# Patient Record
Sex: FEMALE | Race: WHITE | Employment: OTHER | URBAN - METROPOLITAN AREA
[De-identification: names, ages, dates, MRNs, and addresses within clinical notes are randomized per-mention and may not be internally consistent; named-entity substitution may affect disease eponyms.]

---

## 2017-10-23 ENCOUNTER — HOSPITAL ENCOUNTER (OUTPATIENT)
Dept: MAMMOGRAPHY | Age: 57
Discharge: HOME | End: 2017-10-23
Attending: OBSTETRICS & GYNECOLOGY | Admitting: OBSTETRICS & GYNECOLOGY

## 2017-10-23 DIAGNOSIS — R92.8 ABNORMAL FINDING ON MAMMOGRAPHY: ICD-10-CM

## 2017-10-23 DIAGNOSIS — Z12.31 VISIT FOR SCREENING MAMMOGRAM: ICD-10-CM

## 2017-11-01 ENCOUNTER — OFFICE VISIT (OUTPATIENT)
Dept: FAMILY MEDICINE CLINIC | Age: 57
End: 2017-11-01

## 2017-11-01 VITALS
BODY MASS INDEX: 23.3 KG/M2 | HEIGHT: 66 IN | OXYGEN SATURATION: 97 % | SYSTOLIC BLOOD PRESSURE: 110 MMHG | HEART RATE: 77 BPM | WEIGHT: 145 LBS | DIASTOLIC BLOOD PRESSURE: 70 MMHG

## 2017-11-01 DIAGNOSIS — E78.00 PURE HYPERCHOLESTEROLEMIA: ICD-10-CM

## 2017-11-01 DIAGNOSIS — Z00.00 PREVENTATIVE HEALTH CARE: Primary | ICD-10-CM

## 2017-11-01 DIAGNOSIS — Z12.11 COLON CANCER SCREENING: ICD-10-CM

## 2017-11-01 DIAGNOSIS — C44.320 SCC (SQUAMOUS CELL CARCINOMA), FACE: ICD-10-CM

## 2017-11-01 PROCEDURE — 90630 INFLUENZA, QUADV, 18-64 YRS, ID, PF, MICRO INJ, 0.1ML (FLUZONE QUADV, PF): CPT | Performed by: FAMILY MEDICINE

## 2017-11-01 PROCEDURE — 90715 TDAP VACCINE 7 YRS/> IM: CPT | Performed by: FAMILY MEDICINE

## 2017-11-01 PROCEDURE — 90471 IMMUNIZATION ADMIN: CPT | Performed by: FAMILY MEDICINE

## 2017-11-01 PROCEDURE — 99396 PREV VISIT EST AGE 40-64: CPT | Performed by: FAMILY MEDICINE

## 2017-11-01 PROCEDURE — 90472 IMMUNIZATION ADMIN EACH ADD: CPT | Performed by: FAMILY MEDICINE

## 2017-11-01 ASSESSMENT — ENCOUNTER SYMPTOMS
ABDOMINAL PAIN: 0
BACK PAIN: 0
CONSTIPATION: 0
SHORTNESS OF BREATH: 0
DIARRHEA: 0

## 2017-11-01 NOTE — PATIENT INSTRUCTIONS
for heart attack and stroke. · Blood pressure. Have your blood pressure checked during a routine doctor visit. Your doctor will tell you how often to check your blood pressure based on your age, your blood pressure results, and other factors. · Mammogram. Ask your doctor how often you should have a mammogram, which is an X-ray of your breasts. A mammogram can spot breast cancer before it can be felt and when it is easiest to treat. · Pap test and pelvic exam. Ask your doctor how often you should have a Pap test. You may not need to have a Pap test as often as you used to. · Vision. Have your eyes checked every year or two or as often as your doctor suggests. Some experts recommend that you have yearly exams for glaucoma and other age-related eye problems starting at age 48. · Hearing. Tell your doctor if you notice any change in your hearing. You can have tests to find out how well you hear. · Diabetes. Ask your doctor whether you should have tests for diabetes. · Colon cancer. You should begin tests for colon cancer at age 48. You may have one of several tests. Your doctor will tell you how often to have tests based on your age and risk. Risks include whether you already had a precancerous polyp removed from your colon or whether your parents, sisters and brothers, or children have had colon cancer. · Thyroid disease. Talk to your doctor about whether to have your thyroid checked as part of a regular physical exam. Women have an increased chance of a thyroid problem. · Osteoporosis. You should begin tests for bone density at age 72. If you are younger than 72, ask your doctor whether you have factors that may increase your risk for this disease. You may want to have this test before age 72. · Heart attack and stroke risk. At least every 4 to 6 years, you should have your risk for heart attack and stroke assessed.  Your doctor uses factors such as your age, blood pressure, cholesterol, and whether you smoke foods.  ¨ Eat fruits, vegetables, whole grains (like oatmeal), dried beans and peas, nuts and seeds, soy products (like tofu), and fat-free or low-fat dairy products. ¨ Replace butter, margarine, and hydrogenated or partially hydrogenated oils with olive and canola oils. (Canola oil margarine without trans fat is fine.)  ¨ Replace red meat with fish, poultry, and soy protein (like tofu). ¨ Limit processed and packaged foods like chips, crackers, and cookies. · Be active. Exercise can improve your cholesterol level. Get at least 30 minutes of exercise on most days of the week. Walking is a good choice. You also may want to do other activities, such as running, swimming, cycling, or playing tennis or team sports. · Stay at a healthy weight. Lose weight if you need to. · Don't smoke. If you need help quitting, talk to your doctor about stop-smoking programs and medicines. These can increase your chances of quitting for good. How is high cholesterol treated? The goal of treatment is to reduce your chances of having a heart attack or stroke. The goal is not to lower your cholesterol numbers only. · You may make lifestyle changes, such as eating healthy foods, not smoking, losing weight, and being more active. · You may have to take medicine. Follow-up care is a key part of your treatment and safety. Be sure to make and go to all appointments, and call your doctor if you are having problems. It's also a good idea to know your test results and keep a list of the medicines you take. Where can you learn more? Go to https://Zip[employer]ila.[employer]. org and sign in to your [employer] account. Enter Z053 in the [facility] box to learn more about \"Learning About High Cholesterol. \"     If you do not have an account, please click on the \"Sign Up Now\" link. Current as of: April 3, 2017  Content Version: 11.3  © 1415-0765 [employer]. Care instructions adapted under license by [facility] ([facility]).

## 2018-01-04 ENCOUNTER — OFFICE VISIT (OUTPATIENT)
Dept: FAMILY MEDICINE CLINIC | Age: 58
End: 2018-01-04

## 2018-01-04 VITALS
OXYGEN SATURATION: 98 % | DIASTOLIC BLOOD PRESSURE: 70 MMHG | BODY MASS INDEX: 23.15 KG/M2 | TEMPERATURE: 97.9 F | RESPIRATION RATE: 16 BRPM | SYSTOLIC BLOOD PRESSURE: 100 MMHG | WEIGHT: 144.5 LBS | HEART RATE: 57 BPM

## 2018-01-04 DIAGNOSIS — H10.9 CONJUNCTIVITIS OF BOTH EYES, UNSPECIFIED CONJUNCTIVITIS TYPE: Primary | ICD-10-CM

## 2018-01-04 PROCEDURE — 99213 OFFICE O/P EST LOW 20 MIN: CPT | Performed by: PHYSICIAN ASSISTANT

## 2018-01-04 RX ORDER — POLYMYXIN B SULFATE AND TRIMETHOPRIM 1; 10000 MG/ML; [USP'U]/ML
1 SOLUTION OPHTHALMIC EVERY 4 HOURS
Qty: 2 BOTTLE | Refills: 0 | Status: SHIPPED | OUTPATIENT
Start: 2018-01-04 | End: 2018-01-14

## 2018-01-04 ASSESSMENT — ENCOUNTER SYMPTOMS
WHEEZING: 0
SORE THROAT: 0
EYE REDNESS: 1
EYE DISCHARGE: 1
EYE PAIN: 1
COUGH: 0
EYE ITCHING: 1

## 2018-01-04 NOTE — PROGRESS NOTES
Subjective:      Patient ID: Venkatesh Mcintyre is a 62 y.o. female. HPI  Patient is here today with a 2-3 day history of bilateral eye redness, drainage and burning. She recently got over a URI but has been fine. Her eyes are itchy and burning. She has been wearing her glasses. Review of Systems   Constitutional: Negative for appetite change, chills, fatigue and fever. HENT: Negative for congestion, ear pain and sore throat. Eyes: Positive for pain, discharge, redness and itching. Negative for visual disturbance. Respiratory: Negative for cough and wheezing. Skin: Negative for rash. Neurological: Negative for dizziness and headaches. Objective:   Physical Exam   Constitutional: She is oriented to person, place, and time. Vital signs are normal. She appears well-developed and well-nourished. She is cooperative. Eyes: Pupils are equal, round, and reactive to light. Right conjunctiva is injected. Left conjunctiva is injected (much worse than right). Neck: Neck supple. Pulmonary/Chest: Effort normal and breath sounds normal. No respiratory distress. Lymphadenopathy:     She has no cervical adenopathy. Neurological: She is alert and oriented to person, place, and time. Assessment:          Zenon Buerger was seen today for conjunctivitis. Diagnoses and all orders for this visit:    Conjunctivitis of both eyes, unspecified conjunctivitis type  -     trimethoprim-polymyxin b (POLYTRIM) 25372-2.1 UNIT/ML-% ophthalmic solution; Place 1 drop into both eyes every 4 hours for 10 days         Plan:      Call with any concerns.

## 2018-04-23 ENCOUNTER — HOSPITAL ENCOUNTER (OUTPATIENT)
Dept: OTHER | Age: 58
Discharge: HOME | End: 2018-04-23
Attending: FAMILY MEDICINE | Admitting: FAMILY MEDICINE

## 2018-04-23 DIAGNOSIS — E78.00 PURE HYPERCHOLESTEROLEMIA: ICD-10-CM

## 2018-04-23 LAB
A/G RATIO: 1.6 (ref 1.1–2.2)
ALBUMIN SERPL-MCNC: 4.3 G/DL (ref 3.4–5)
ALP BLD-CCNC: 57 U/L (ref 40–129)
ALT SERPL-CCNC: 16 U/L (ref 10–40)
ANION GAP SERPL CALCULATED.3IONS-SCNC: 11 MMOL/L (ref 3–16)
AST SERPL-CCNC: 19 U/L (ref 15–37)
BILIRUB SERPL-MCNC: 0.3 MG/DL (ref 0–1)
BUN BLDV-MCNC: 16 MG/DL (ref 7–20)
CALCIUM SERPL-MCNC: 9.3 MG/DL (ref 8.3–10.6)
CHLORIDE BLD-SCNC: 102 MMOL/L (ref 99–110)
CHOLESTEROL, TOTAL: 278 MG/DL (ref 0–199)
CO2: 28 MMOL/L (ref 21–32)
CREAT SERPL-MCNC: 0.6 MG/DL (ref 0.6–1.1)
GFR AFRICAN AMERICAN: >60
GFR NON-AFRICAN AMERICAN: >60
GLOBULIN: 2.7 G/DL
GLUCOSE BLD-MCNC: 91 MG/DL (ref 70–99)
HDLC SERPL-MCNC: 72 MG/DL (ref 40–60)
LDL CHOLESTEROL CALCULATED: 188 MG/DL
POTASSIUM SERPL-SCNC: 4.5 MMOL/L (ref 3.5–5.1)
SODIUM BLD-SCNC: 141 MMOL/L (ref 136–145)
TOTAL PROTEIN: 7 G/DL (ref 6.4–8.2)
TRIGL SERPL-MCNC: 89 MG/DL (ref 0–150)
VLDLC SERPL CALC-MCNC: 18 MG/DL

## 2018-06-04 ENCOUNTER — HOSPITAL ENCOUNTER (OUTPATIENT)
Dept: MAMMOGRAPHY | Age: 58
Discharge: HOME | End: 2018-06-04
Attending: OBSTETRICS & GYNECOLOGY | Admitting: OBSTETRICS & GYNECOLOGY

## 2018-06-04 DIAGNOSIS — R92.1 BREAST CALCIFICATIONS: ICD-10-CM

## 2018-12-14 ENCOUNTER — HOSPITAL ENCOUNTER (OUTPATIENT)
Dept: MAMMOGRAPHY | Age: 58
Discharge: HOME | End: 2018-12-14
Payer: COMMERCIAL

## 2018-12-14 DIAGNOSIS — Z12.31 VISIT FOR SCREENING MAMMOGRAM: ICD-10-CM

## 2018-12-14 PROCEDURE — G0279 TOMOSYNTHESIS, MAMMO: HCPCS

## 2019-02-04 ENCOUNTER — TELEPHONE (OUTPATIENT)
Dept: FAMILY MEDICINE CLINIC | Age: 59
End: 2019-02-04

## 2019-06-17 ENCOUNTER — HOSPITAL ENCOUNTER (OUTPATIENT)
Dept: MAMMOGRAPHY | Age: 59
Discharge: HOME | End: 2019-06-17
Payer: COMMERCIAL

## 2019-06-17 DIAGNOSIS — R92.8 ABNORMAL MAMMOGRAM: ICD-10-CM

## 2019-06-17 PROCEDURE — 77065 DX MAMMO INCL CAD UNI: CPT

## 2019-12-31 ENCOUNTER — HOSPITAL ENCOUNTER (OUTPATIENT)
Dept: MAMMOGRAPHY | Age: 59
Discharge: HOME | End: 2019-12-31
Payer: COMMERCIAL

## 2019-12-31 PROCEDURE — G0279 TOMOSYNTHESIS, MAMMO: HCPCS

## 2020-01-03 ENCOUNTER — HOSPITAL ENCOUNTER (OUTPATIENT)
Dept: MAMMOGRAPHY | Age: 60
Discharge: HOME | End: 2020-01-03
Payer: COMMERCIAL

## 2020-01-03 PROCEDURE — 88305 TISSUE EXAM BY PATHOLOGIST: CPT

## 2020-01-03 PROCEDURE — 88342 IMHCHEM/IMCYTCHM 1ST ANTB: CPT

## 2020-01-03 PROCEDURE — 88360 TUMOR IMMUNOHISTOCHEM/MANUAL: CPT

## 2020-01-03 PROCEDURE — 19081 BX BREAST 1ST LESION STRTCTC: CPT

## 2020-01-16 ENCOUNTER — OFFICE VISIT (OUTPATIENT)
Dept: SURGERY | Age: 60
End: 2020-01-16
Payer: COMMERCIAL

## 2020-01-16 VITALS
HEIGHT: 67 IN | WEIGHT: 142.8 LBS | DIASTOLIC BLOOD PRESSURE: 84 MMHG | BODY MASS INDEX: 22.41 KG/M2 | RESPIRATION RATE: 16 BRPM | OXYGEN SATURATION: 100 % | HEART RATE: 70 BPM | SYSTOLIC BLOOD PRESSURE: 117 MMHG | TEMPERATURE: 97.3 F

## 2020-01-16 PROCEDURE — 99205 OFFICE O/P NEW HI 60 MIN: CPT | Performed by: SURGERY

## 2020-01-16 RX ORDER — ATORVASTATIN CALCIUM 20 MG/1
20 TABLET, FILM COATED ORAL
COMMUNITY
Start: 2019-05-15 | End: 2022-09-23 | Stop reason: ALTCHOICE

## 2020-01-16 ASSESSMENT — ENCOUNTER SYMPTOMS
SHORTNESS OF BREATH: 0
COUGH: 0
BACK PAIN: 0
ABDOMINAL DISTENTION: 0
ABDOMINAL PAIN: 0

## 2020-01-16 NOTE — PROGRESS NOTES
obtained including the risk of bleeding, infection, and failure to make a diagnosis, the patient was placed in the prone position on the stereotactic table and the calcifications in the right breast at 11-12 o'clock, 4 cm from    the nipple were localized with stereo pair images in the craniocaudal position.       The skin was cleansed and approximately 15 mL of 1 % lidocaine was utilized for local anesthetic. A small skin nick was made on the surface of the breast with an 11 blade scalpel. The 9-gauge vacuum-assisted Suros needle was advanced into the breast to    the appropriate depth as calculated by stereo pair images. Pre-and post-fire images were obtained for needle placement.       Tissue sampling was obtained in a clockwise fashion. Multiple cores were obtained and sent to pathology for review. Specimen radiograph shows numerous calcifications in 3 cores.       Localizing clip was inserted at the site of biopsy.  Jose Alberto-shaped clip was utilized.       Hemostasis was obtained. Blood loss was minimal. The patient sustained the procedure without complications. The patient was sent home with written and oral aftercare instructions       The patient was directed to followup with her physician or our nurse navigator for biopsy results.       The patient was removed from the stereotactic room and a two-view mammogram was performed on a dedicated mammographic unit. Postprocedural mammogram shows no evidence of migration.           Impression       Technically successful stereotactic core biopsy.       Awaiting pathology results     KRISTIE DONAVON DIGITAL DIAGNOSTIC BILATERAL ON DECEMBER 31, 2019       HISTORY: Six-month follow-up. Abnormal right mammogram. Calcifications right.       COMPARISON: Bilateral mammogram December 14, 2018. June 17, 2019 right mammogram.       LIMITATIONS: None.       FINDINGS:       The breasts are heterogeneously dense. No dominant mass or suspicious microcalcifications.  Magnification views of the calcifications in the right breast in the upper and outer aspect demonstrated indeterminate calcifications within a small 1 cc volume    mildly pleomorphic. Stereotactic biopsy recommended. Findings discussed with the patient as well as the mammography nurse who will call the referring physician and will arrange for a stereotactic biopsy. The left breast is unremarkable.       This study was performed and interpreted using Full Field Digital Mammography and Computer Aided Detection.       Please note that mammography is not 100% accurate in diagnosing breast cancer.  A routine breast exam is recommended to correlate with mammographic findings.       Any palpable abnormality must be assessed clinically.  If the patient has a new palpable abnormality, then a Diagnostic Mammogram and/or Breast Ultrasound is indicated if clinically appropriate. Past Medical History:   Diagnosis Date    Hyperlipidemia      No past surgical history on file. Social History     Tobacco Use    Smoking status: Never Smoker    Smokeless tobacco: Never Used   Substance Use Topics    Alcohol use: Yes     Comment: occasional      Current Outpatient Medications on File Prior to Visit   Medication Sig Dispense Refill    atorvastatin (LIPITOR) 20 MG tablet Take 20 mg by mouth      multivitamin-iron-minerals-folic acid (CENTRUM) chewable tablet Take 1 tablet by mouth daily. No current facility-administered medications on file prior to visit. No Known Allergies  Review of Systems   Constitutional: Negative for appetite change, fatigue and unexpected weight change. Respiratory: Negative for cough and shortness of breath. Cardiovascular: Negative for chest pain and leg swelling. Gastrointestinal: Negative for abdominal distention and abdominal pain. Genitourinary: Negative for difficulty urinating and dysuria. Musculoskeletal: Negative for arthralgias and back pain. Skin: Negative for rash and wound. Allergic/Immunologic: Negative for environmental allergies and food allergies. Neurological: Negative for dizziness and headaches. Hematological: Negative for adenopathy. Does not bruise/bleed easily. Psychiatric/Behavioral: Negative for dysphoric mood. The patient is not nervous/anxious. Exam:  /84 (Site: Left Upper Arm, Position: Sitting, Cuff Size: Medium Adult)   Pulse 70   Temp 97.3 °F (36.3 °C) (Oral)   Resp 16   Ht 5' 7\" (1.702 m)   Wt 142 lb 12.8 oz (64.8 kg)   SpO2 100%   Breastfeeding? No   BMI 22.37 kg/m²   Constitutional: She appears well-nourished. No apparent distress. Head: Normocephalic and atraumatic. Eyes: EOM are normal. Pupils are equal, round, and reactive to light. Neck: Neck supple. No tracheal deviation present. No thyromegaly. Cardiovascular: Regular rate and rhythm. Pulmonary: Respirations are non-labored, no wheezing. Lymphatics: No palpable cervical, supraclavicular, or axillary lymphadenopathy. Breast: 36B bilateral, symmetric, psuedoptosis  Right: No masses, nipple discharge, or overlying skin changes. Left: No masses, nipple discharge, or overlying skin changes. There is no axillary lymphadenopathy palpated bilaterally. Skin: No rash noted. Multiple nevi chest, back, upper extremities, none concerning. Extremities: Well perfused, no edema. Neurologic: Alert and oriented. Assessment/Plan:    Clinical [id number] STAGE:  0 right breast DCIS ER + 9mm on imaging. Family history of breast cancer. I have reviewed the results of her most recent breast imaging and pathology with her. The surgical rationale and technical details of breast conservation therapy versus  mastectomy were reviewed. She understands that patients who undergo breast conservation therapy, systemic therapy, and radiotherapy have comparable local recurrences to those undergoing a mastectomy.  She understands she may experience an asymmetric change in breast contour with breast conservation that can be exacerbated with radiation. therapy. We discussed the technique of needle localization. The risks of surgery were discussed, including the risks of bleeding, infection, recurrence, poor cosmesis, and need for additional procedures, including re-excision for margins, mastectomy. Systemic endocrine therapy and radiation therapy reviewed. I have recommended she undergo consultation with both medical and radiation oncology. She is amenable to genetic counseling/testing going forward, but does not feel it will affect her surgical decision making.     - Dr. Marrero [place] for rad onc  - Dr. London Mann to med onc    We will plan for a right partial mastectomy with localization next week. She and her daughter are happy with discussion and plan. 60 minutes face to face time.

## 2020-01-17 ENCOUNTER — ANESTHESIA EVENT (OUTPATIENT)
Dept: OPERATING ROOM | Age: 60
End: 2020-01-17
Payer: COMMERCIAL

## 2020-01-17 NOTE — PROGRESS NOTES
[facility] [employer] / [facility] ([facility]) [address], [address]    Acknowledgment of Informed Consent for Surgical or Medical Procedure and Sedation  I agree to allow doctor(s) [facility] and his/her associates or assistants, including residents and/or other qualified medical practitioner to perform the following medical treatment or procedure and to administer or direct the administration of sedation as necessary:  Procedure(s): RIGHT BREAST NEEDLE LOCALIZED, PARTIAL MASTECTOMY  My doctor has explained the following regarding the proposed procedure:   the explanation of the procedure   the benefits of the procedure   the potential problems that might occur during recuperation   the risks and side effects of the procedure which could include but are not limited to severe blood loss, infection, stroke or death   the benefits, risks and side effect of alternative procedures including the consequences of declining this procedure or any alternative procedures   the likelihood of achieving satisfactory results. I acknowledge no guarantee or assurance has been made to me regarding the results. I understand that during the course of this treatment/procedure, unforeseen conditions can occur which require an additional or different procedure. I agree to allow my physician or assistants to perform such extension of the original procedure as they may find necessary. I understand that sedation will often result in temporary impairment of memory and fine motor skills and that sedation can occasionally progress to a state of deep sedation or general anesthesia. I understand the risks of anesthesia for surgery include, but are not limited to, sore throat, hoarseness, injury to face, mouth, or teeth; nausea; headache; injury to blood vessels or nerves; death, brain damage, or paralysis.     I understand that if I have a Limitation of Treatment order in effect during my hospitalization, the

## 2020-01-17 NOTE — PROGRESS NOTES
be registered at your bedside once brought back to your room. 5. DO NOT EAT ANYTHING eight hours prior to surgery. May have 8 ounces of water 4 hours prior to surgery. 6. MEDICATIONS    Take the following medications with a SMALL sip of water:  NONE   Use your usual dose of inhalers the morning of surgery. BRING your rescue inhaler with you to hospital.    Anesthesia does NOT want you to take insulin the morning of surgery. They will control your blood sugar while you are at the hospital. Please contact your ordering physician for instructions regarding your insulin the night before your procedure. If you have an insulin pump, please keep it set on basal rate. 7. Do not swallow water when brushing teeth. No gum, candy, mints or ice chips. Refrain from smoking or at least decrease the amount. 8. Dress in loose, comfortable clothing appropriate for redressing after your procedure. Do not wear jewelry (including body piercings), make-up (especially NO eye make-up), fingernail polish (NO toenail polish if foot/leg surgery), lotion, powders or metal hairclips. 9. Dentures, glasses, or contacts will need to be removed before your procedure. Bring cases for your glasses, contacts, dentures, or hearing aids to protect them while you are in surgery. 10. If you use a CPAP, please bring it with you on the day of your procedure. 11. We recommend that valuable personal  belongings such as cash, cell phones, e-tablets or jewelry, be left at home during your stay. The hospital will not be responsible for valuables that are not secured in the hospital safe. However, if your insurance requires a co-pay, you may want to bring a method of payment, i.e. Check or credit card, if you wish to pay your co-pay the day of surgery. 12. If you are to stay overnight, you may bring a bag with personal items.  Please have any large items you may need brought in by your family after your arrival to your hospital room.    13. If you have a Living Will or Durable Power of , please bring a copy on the day of your procedure. 15. With your permission, one family member may accompany you while you are being prepared for surgery. Once you are ready, additional family members may join you. HOW WE KEEP YOU SAFE and WORK TO PREVENT SURGICAL SITE INFECTIONS:  1. Health care workers should always check your ID bracelet to verify your name and birth date. You will be asked many times to state your name, date of birth, and allergies. 2. Health care workers should always clean their hands with soap or alcohol gel before providing care to you. It is okay to ask anyone if they cleaned their hands before they touch you. 3. You will be actively involved in verifying the type of procedure you are having and ensuring the correct surgical site. This will be confirmed multiple times prior to your procedure. Do NOT mychal your surgery site UNLESS instructed to by your surgeon. 4. Do not shave or wax for 72 hours prior to procedure near your operative site. Shaving with a razor can irritate your skin and make it easier to develop an infection. On the day of your procedure, any hair that needs to be removed near the surgical site will be clipped by a healthcare worker using a special clippers designed to avoid skin irritation. 5. When you are in the operating room, your surgical site will be cleansed with a special soap, and in most cases, you will be given an antibiotic before the surgery begins. What to expect AFTER YOUR PROCEDURE:  1. Immediately following your procedure, your will be taken to the PACU for the first phase of your recovery. Your nurse will help you recover from any potential side effects of anesthesia, such as extreme drowsiness, changes in your vital signs or breathing patterns. Nausea, headache, muscle aches, or sore throat may also occur after anesthesia.   Your nurse will help you manage these potential side effects. 2. For comfort and safety, arrange to have someone at home with you for the first 24 hours after discharge. 3. You and your family will be given written instructions about your diet, activity, dressing care, medications, and return visits. 4. Once at home, should issues with nausea, pain, or bleeding occur, or should you notice any signs of infection, you should call your surgeon. 5. Always clean your hands before and after caring for your wound. Do not let your family touch your surgery site without cleaning their hands. 6. Narcotic pain medications can cause significant constipation. You may want to add a stool softener to your postoperative medication schedule or speak to your surgeon on how best to manage this SIDE EFFECT. SPECIAL INSTRUCTIONS     Thank you for allowing us to care for you. We strive to exceed your expectations in the delivery of care and service provided to you and your family. If you need to contact us for any reason, please call us at [phone]    Instructions reviewed with patient during preadmission testing phone interview. Ning Gilliland. 1/17/2020 .12:52 PM      ADDITIONAL EDUCATIONAL INFORMATION REVIEWED PER PHONE WITH YOU AND/OR YOUR FAMILY:  No Bring a urine sample on day of surgery  Yes Pain Goal-Taking Control of Your Pain  Yes FAQs about Surgical Site Infections  No Hibiclens® Bathing Instructions   No Antibacterial Soap  No Fito® Wipes Bathing Instructions (Obtained from: https://www.[employer]/. pdf )  No Incentive Spirometer Education  No Other

## 2020-01-20 ENCOUNTER — HOSPITAL ENCOUNTER (OUTPATIENT)
Dept: MAMMOGRAPHY | Age: 60
Discharge: HOME | End: 2020-01-20
Payer: COMMERCIAL

## 2020-01-20 ENCOUNTER — ANESTHESIA (OUTPATIENT)
Dept: OPERATING ROOM | Age: 60
End: 2020-01-20
Payer: COMMERCIAL

## 2020-01-20 ENCOUNTER — HOSPITAL ENCOUNTER (OUTPATIENT)
Age: 60
Setting detail: OUTPATIENT SURGERY
Discharge: HOME | End: 2020-01-20
Attending: SURGERY | Admitting: SURGERY
Payer: COMMERCIAL

## 2020-01-20 ENCOUNTER — APPOINTMENT (OUTPATIENT)
Dept: MAMMOGRAPHY | Age: 60
End: 2020-01-20
Attending: SURGERY
Payer: COMMERCIAL

## 2020-01-20 VITALS
WEIGHT: 137 LBS | SYSTOLIC BLOOD PRESSURE: 100 MMHG | HEIGHT: 67 IN | BODY MASS INDEX: 21.5 KG/M2 | RESPIRATION RATE: 14 BRPM | HEART RATE: 68 BPM | TEMPERATURE: 97 F | DIASTOLIC BLOOD PRESSURE: 64 MMHG | OXYGEN SATURATION: 99 %

## 2020-01-20 VITALS — OXYGEN SATURATION: 100 % | DIASTOLIC BLOOD PRESSURE: 72 MMHG | SYSTOLIC BLOOD PRESSURE: 111 MMHG | TEMPERATURE: 95.7 F

## 2020-01-20 PROCEDURE — 2500000003 HC RX 250 WO HCPCS: Performed by: NURSE ANESTHETIST, CERTIFIED REGISTERED

## 2020-01-20 PROCEDURE — 2580000003 HC RX 258: Performed by: ANESTHESIOLOGY

## 2020-01-20 PROCEDURE — 2709999900 HC NON-CHARGEABLE SUPPLY: Performed by: SURGERY

## 2020-01-20 PROCEDURE — 6360000002 HC RX W HCPCS: Performed by: NURSE ANESTHETIST, CERTIFIED REGISTERED

## 2020-01-20 PROCEDURE — 2500000003 HC RX 250 WO HCPCS: Performed by: SURGERY

## 2020-01-20 PROCEDURE — 14301 TIS TRNFR ANY 30.1-60 SQ CM: CPT | Performed by: SURGERY

## 2020-01-20 PROCEDURE — 2580000003 HC RX 258: Performed by: SURGERY

## 2020-01-20 PROCEDURE — 7100000010 HC PHASE II RECOVERY - FIRST 15 MIN: Performed by: SURGERY

## 2020-01-20 PROCEDURE — 7100000011 HC PHASE II RECOVERY - ADDTL 15 MIN: Performed by: SURGERY

## 2020-01-20 PROCEDURE — 3700000000 HC ANESTHESIA ATTENDED CARE: Performed by: SURGERY

## 2020-01-20 PROCEDURE — 3600000004 HC SURGERY LEVEL 4 BASE: Performed by: SURGERY

## 2020-01-20 PROCEDURE — 88307 TISSUE EXAM BY PATHOLOGIST: CPT

## 2020-01-20 PROCEDURE — 76098 X-RAY EXAM SURGICAL SPECIMEN: CPT

## 2020-01-20 PROCEDURE — 2709999900 MAM NEEDLE BREAST LOCALIZATION RIGHT

## 2020-01-20 PROCEDURE — 3700000001 HC ADD 15 MINUTES (ANESTHESIA): Performed by: SURGERY

## 2020-01-20 PROCEDURE — 7100000000 HC PACU RECOVERY - FIRST 15 MIN: Performed by: SURGERY

## 2020-01-20 PROCEDURE — 7100000001 HC PACU RECOVERY - ADDTL 15 MIN: Performed by: SURGERY

## 2020-01-20 PROCEDURE — L8000 MASTECTOMY BRA: HCPCS | Performed by: SURGERY

## 2020-01-20 PROCEDURE — 19301 PARTIAL MASTECTOMY: CPT | Performed by: SURGERY

## 2020-01-20 PROCEDURE — 88305 TISSUE EXAM BY PATHOLOGIST: CPT

## 2020-01-20 PROCEDURE — 3600000014 HC SURGERY LEVEL 4 ADDTL 15MIN: Performed by: SURGERY

## 2020-01-20 RX ORDER — PROCHLORPERAZINE EDISYLATE 5 MG/ML
5 INJECTION INTRAMUSCULAR; INTRAVENOUS
Status: DISCONTINUED | OUTPATIENT
Start: 2020-01-20 | End: 2020-01-20 | Stop reason: HOSPADM

## 2020-01-20 RX ORDER — MAGNESIUM HYDROXIDE 1200 MG/15ML
LIQUID ORAL CONTINUOUS PRN
Status: COMPLETED | OUTPATIENT
Start: 2020-01-20 | End: 2020-01-20

## 2020-01-20 RX ORDER — SODIUM CHLORIDE 0.9 % (FLUSH) 0.9 %
10 SYRINGE (ML) INJECTION EVERY 12 HOURS SCHEDULED
Status: DISCONTINUED | OUTPATIENT
Start: 2020-01-20 | End: 2020-01-20 | Stop reason: HOSPADM

## 2020-01-20 RX ORDER — HYDROCODONE BITARTRATE AND ACETAMINOPHEN 5; 325 MG/1; MG/1
1 TABLET ORAL
Status: DISCONTINUED | OUTPATIENT
Start: 2020-01-20 | End: 2020-01-20 | Stop reason: HOSPADM

## 2020-01-20 RX ORDER — MIDAZOLAM HYDROCHLORIDE 1 MG/ML
INJECTION INTRAMUSCULAR; INTRAVENOUS PRN
Status: DISCONTINUED | OUTPATIENT
Start: 2020-01-20 | End: 2020-01-20 | Stop reason: SDUPTHER

## 2020-01-20 RX ORDER — OXYCODONE HYDROCHLORIDE AND ACETAMINOPHEN 5; 325 MG/1; MG/1
1 TABLET ORAL EVERY 6 HOURS PRN
Qty: 28 TABLET | Refills: 0 | Status: SHIPPED | OUTPATIENT
Start: 2020-01-20 | End: 2020-01-27

## 2020-01-20 RX ORDER — CEFAZOLIN SODIUM 2 G/50ML
SOLUTION INTRAVENOUS PRN
Status: DISCONTINUED | OUTPATIENT
Start: 2020-01-20 | End: 2020-01-20 | Stop reason: SDUPTHER

## 2020-01-20 RX ORDER — HYDRALAZINE HYDROCHLORIDE 20 MG/ML
5 INJECTION INTRAMUSCULAR; INTRAVENOUS EVERY 10 MIN PRN
Status: DISCONTINUED | OUTPATIENT
Start: 2020-01-20 | End: 2020-01-20 | Stop reason: HOSPADM

## 2020-01-20 RX ORDER — FENTANYL CITRATE 50 UG/ML
INJECTION, SOLUTION INTRAMUSCULAR; INTRAVENOUS PRN
Status: DISCONTINUED | OUTPATIENT
Start: 2020-01-20 | End: 2020-01-20 | Stop reason: SDUPTHER

## 2020-01-20 RX ORDER — DEXAMETHASONE SODIUM PHOSPHATE 4 MG/ML
INJECTION, SOLUTION INTRA-ARTICULAR; INTRALESIONAL; INTRAMUSCULAR; INTRAVENOUS; SOFT TISSUE PRN
Status: DISCONTINUED | OUTPATIENT
Start: 2020-01-20 | End: 2020-01-20 | Stop reason: SDUPTHER

## 2020-01-20 RX ORDER — ONDANSETRON 2 MG/ML
4 INJECTION INTRAMUSCULAR; INTRAVENOUS
Status: DISCONTINUED | OUTPATIENT
Start: 2020-01-20 | End: 2020-01-20 | Stop reason: HOSPADM

## 2020-01-20 RX ORDER — EPHEDRINE SULFATE 50 MG/ML
INJECTION, SOLUTION INTRAVENOUS PRN
Status: DISCONTINUED | OUTPATIENT
Start: 2020-01-20 | End: 2020-01-20 | Stop reason: SDUPTHER

## 2020-01-20 RX ORDER — PROPOFOL 10 MG/ML
INJECTION, EMULSION INTRAVENOUS PRN
Status: DISCONTINUED | OUTPATIENT
Start: 2020-01-20 | End: 2020-01-20 | Stop reason: SDUPTHER

## 2020-01-20 RX ORDER — SODIUM CHLORIDE, SODIUM LACTATE, POTASSIUM CHLORIDE, CALCIUM CHLORIDE 600; 310; 30; 20 MG/100ML; MG/100ML; MG/100ML; MG/100ML
INJECTION, SOLUTION INTRAVENOUS CONTINUOUS
Status: DISCONTINUED | OUTPATIENT
Start: 2020-01-20 | End: 2020-01-20 | Stop reason: HOSPADM

## 2020-01-20 RX ORDER — MEPERIDINE HYDROCHLORIDE 25 MG/ML
12.5 INJECTION INTRAMUSCULAR; INTRAVENOUS; SUBCUTANEOUS EVERY 5 MIN PRN
Status: DISCONTINUED | OUTPATIENT
Start: 2020-01-20 | End: 2020-01-20 | Stop reason: HOSPADM

## 2020-01-20 RX ORDER — LIDOCAINE HYDROCHLORIDE 20 MG/ML
INJECTION, SOLUTION INTRAVENOUS PRN
Status: DISCONTINUED | OUTPATIENT
Start: 2020-01-20 | End: 2020-01-20 | Stop reason: SDUPTHER

## 2020-01-20 RX ORDER — DIPHENHYDRAMINE HYDROCHLORIDE 50 MG/ML
12.5 INJECTION INTRAMUSCULAR; INTRAVENOUS
Status: DISCONTINUED | OUTPATIENT
Start: 2020-01-20 | End: 2020-01-20 | Stop reason: HOSPADM

## 2020-01-20 RX ORDER — ONDANSETRON 2 MG/ML
INJECTION INTRAMUSCULAR; INTRAVENOUS PRN
Status: DISCONTINUED | OUTPATIENT
Start: 2020-01-20 | End: 2020-01-20 | Stop reason: SDUPTHER

## 2020-01-20 RX ORDER — 0.9 % SODIUM CHLORIDE 0.9 %
500 INTRAVENOUS SOLUTION INTRAVENOUS
Status: DISCONTINUED | OUTPATIENT
Start: 2020-01-20 | End: 2020-01-20 | Stop reason: HOSPADM

## 2020-01-20 RX ORDER — SODIUM CHLORIDE 0.9 % (FLUSH) 0.9 %
10 SYRINGE (ML) INJECTION PRN
Status: DISCONTINUED | OUTPATIENT
Start: 2020-01-20 | End: 2020-01-20 | Stop reason: HOSPADM

## 2020-01-20 RX ADMIN — PROPOFOL 50 MG: 10 INJECTION, EMULSION INTRAVENOUS at 10:28

## 2020-01-20 RX ADMIN — CEFAZOLIN SODIUM 2 G: 2 SOLUTION INTRAVENOUS at 10:15

## 2020-01-20 RX ADMIN — FENTANYL CITRATE 50 MCG: 50 INJECTION INTRAMUSCULAR; INTRAVENOUS at 10:12

## 2020-01-20 RX ADMIN — EPHEDRINE SULFATE 5 MG: 50 INJECTION, SOLUTION INTRAMUSCULAR; INTRAVENOUS; SUBCUTANEOUS at 10:35

## 2020-01-20 RX ADMIN — ONDANSETRON 4 MG: 2 INJECTION INTRAMUSCULAR; INTRAVENOUS at 10:32

## 2020-01-20 RX ADMIN — EPHEDRINE SULFATE 5 MG: 50 INJECTION, SOLUTION INTRAMUSCULAR; INTRAVENOUS; SUBCUTANEOUS at 10:22

## 2020-01-20 RX ADMIN — SODIUM CHLORIDE, SODIUM LACTATE, POTASSIUM CHLORIDE, AND CALCIUM CHLORIDE: 600; 310; 30; 20 INJECTION, SOLUTION INTRAVENOUS at 08:59

## 2020-01-20 RX ADMIN — DEXAMETHASONE SODIUM PHOSPHATE 4 MG: 4 INJECTION, SOLUTION INTRAMUSCULAR; INTRAVENOUS at 10:32

## 2020-01-20 RX ADMIN — MIDAZOLAM HYDROCHLORIDE 1 MG: 2 INJECTION, SOLUTION INTRAMUSCULAR; INTRAVENOUS at 10:09

## 2020-01-20 RX ADMIN — EPHEDRINE SULFATE 5 MG: 50 INJECTION, SOLUTION INTRAMUSCULAR; INTRAVENOUS; SUBCUTANEOUS at 11:04

## 2020-01-20 RX ADMIN — EPHEDRINE SULFATE 5 MG: 50 INJECTION, SOLUTION INTRAMUSCULAR; INTRAVENOUS; SUBCUTANEOUS at 10:19

## 2020-01-20 RX ADMIN — EPHEDRINE SULFATE 5 MG: 50 INJECTION, SOLUTION INTRAMUSCULAR; INTRAVENOUS; SUBCUTANEOUS at 10:43

## 2020-01-20 RX ADMIN — LIDOCAINE HYDROCHLORIDE 50 MG: 20 INJECTION, SOLUTION INTRAVENOUS at 10:11

## 2020-01-20 RX ADMIN — SODIUM CHLORIDE, SODIUM LACTATE, POTASSIUM CHLORIDE, AND CALCIUM CHLORIDE: 600; 310; 30; 20 INJECTION, SOLUTION INTRAVENOUS at 10:52

## 2020-01-20 RX ADMIN — FENTANYL CITRATE 50 MCG: 50 INJECTION INTRAMUSCULAR; INTRAVENOUS at 10:27

## 2020-01-20 RX ADMIN — PROPOFOL 150 MG: 10 INJECTION, EMULSION INTRAVENOUS at 10:12

## 2020-01-20 ASSESSMENT — PAIN DESCRIPTION - DESCRIPTORS: DESCRIPTORS: ACHING

## 2020-01-20 ASSESSMENT — PULMONARY FUNCTION TESTS
PIF_VALUE: 14
PIF_VALUE: 12
PIF_VALUE: 13
PIF_VALUE: 14
PIF_VALUE: 21
PIF_VALUE: 15
PIF_VALUE: 15
PIF_VALUE: 14
PIF_VALUE: 15
PIF_VALUE: 14
PIF_VALUE: 18
PIF_VALUE: 1
PIF_VALUE: 16
PIF_VALUE: 15
PIF_VALUE: 31
PIF_VALUE: 15
PIF_VALUE: 15
PIF_VALUE: 19
PIF_VALUE: 14
PIF_VALUE: 14
PIF_VALUE: 17
PIF_VALUE: 15
PIF_VALUE: 16
PIF_VALUE: 3
PIF_VALUE: 14
PIF_VALUE: 1
PIF_VALUE: 19
PIF_VALUE: 12
PIF_VALUE: 19
PIF_VALUE: 19
PIF_VALUE: 16
PIF_VALUE: 15
PIF_VALUE: 14
PIF_VALUE: 12
PIF_VALUE: 15
PIF_VALUE: 14
PIF_VALUE: 12
PIF_VALUE: 12
PIF_VALUE: 14
PIF_VALUE: 12
PIF_VALUE: 15
PIF_VALUE: 15
PIF_VALUE: 13
PIF_VALUE: 20
PIF_VALUE: 15
PIF_VALUE: 1
PIF_VALUE: 15
PIF_VALUE: 17
PIF_VALUE: 1
PIF_VALUE: 1
PIF_VALUE: 15
PIF_VALUE: 15
PIF_VALUE: 14
PIF_VALUE: 13
PIF_VALUE: 2
PIF_VALUE: 13
PIF_VALUE: 10
PIF_VALUE: 15
PIF_VALUE: 16
PIF_VALUE: 18
PIF_VALUE: 18
PIF_VALUE: 14
PIF_VALUE: 12
PIF_VALUE: 19
PIF_VALUE: 14
PIF_VALUE: 15
PIF_VALUE: 19
PIF_VALUE: 15
PIF_VALUE: 13
PIF_VALUE: 6
PIF_VALUE: 14
PIF_VALUE: 14
PIF_VALUE: 18
PIF_VALUE: 15
PIF_VALUE: 15
PIF_VALUE: 12
PIF_VALUE: 14
PIF_VALUE: 14
PIF_VALUE: 15
PIF_VALUE: 2
PIF_VALUE: 14
PIF_VALUE: 13
PIF_VALUE: 15
PIF_VALUE: 15
PIF_VALUE: 1
PIF_VALUE: 1
PIF_VALUE: 14
PIF_VALUE: 19
PIF_VALUE: 15
PIF_VALUE: 18
PIF_VALUE: 12
PIF_VALUE: 1
PIF_VALUE: 15
PIF_VALUE: 14
PIF_VALUE: 21
PIF_VALUE: 15
PIF_VALUE: 12
PIF_VALUE: 16
PIF_VALUE: 19
PIF_VALUE: 14
PIF_VALUE: 14
PIF_VALUE: 13
PIF_VALUE: 14

## 2020-01-20 ASSESSMENT — LIFESTYLE VARIABLES: SMOKING_STATUS: 0

## 2020-01-20 ASSESSMENT — PAIN DESCRIPTION - ORIENTATION: ORIENTATION: RIGHT

## 2020-01-20 ASSESSMENT — PAIN SCALES - GENERAL
PAINLEVEL_OUTOF10: 0
PAINLEVEL_OUTOF10: 1

## 2020-01-20 ASSESSMENT — PAIN - FUNCTIONAL ASSESSMENT: PAIN_FUNCTIONAL_ASSESSMENT: 0-10

## 2020-01-20 ASSESSMENT — PAIN DESCRIPTION - PAIN TYPE: TYPE: SURGICAL PAIN

## 2020-01-20 ASSESSMENT — PAIN DESCRIPTION - LOCATION: LOCATION: BREAST

## 2020-01-20 NOTE — PROGRESS NOTES
Ambulatory Surgery/Procedure Discharge Note    Vitals:    01/20/20 1310   BP: 100/64   Pulse: 68   Resp: 14   Temp: 97 °F (36.1 °C)   SpO2: 99%       In: 2125 [P.O.:160; I.V.:1915]  Out: -     Restroom use offered before discharge. Yes voided    Pain assessment:  level of pain (1-10, 10 severe),   Pain Level: 1   drsg dry ice and bra on        Patient discharged to home/self care.  Patient discharged via wheel chair by transporter to waiting family/S.O.       1/20/2020 1:12 PM

## 2020-01-20 NOTE — BRIEF OP NOTE
Brief Postoperative Note  ______________________________________________________________    Patient: Darrick Ross  YOB: 1960  MRN: [id number]  Date of Procedure: 1/20/2020    Pre-Op Diagnosis: DUCTAL CARCINOMA IN SITU (DCIS) RIGHT BREAST D05.11    Post-Op Diagnosis: Same       Procedure(s):  RIGHT BREAST NEEDLE LOCALIZED, PARTIAL MASTECTOMY    Anesthesia: General    Surgeon(s):  Nicole Brown MD    Assistant: Tone Nicole DO PGY3    Estimated Blood Loss (mL): 29MA    Complications: None    Specimens:   ID Type Source Tests Collected by Time Destination   A : RIGHT PARTIAL MASTECTOMY Tissue Tissue SURGICAL PATHOLOGY Nicole Brown MD 1/20/2020 1036    B : ANTERIOR MARGIN Tissue Tissue SURGICAL PATHOLOGY Nicole Brown MD 1/20/2020 1102    C : POSTERIOR MARGIN Tissue Tissue SURGICAL PATHOLOGY Nicole Brown MD 1/20/2020 1106    D : LATERAL MARGIN Tissue Tissue SURGICAL PATHOLOGY Nicole Brown MD 1/20/2020 1108    E : INFERIOR MARGIN Tissue Tissue SURGICAL PATHOLOGY Nicole Brown MD 1/20/2020 1110    F : MEDIAL MARGIN Tissue Tissue SURGICAL PATHOLOGY Nicole Brown MD 1/20/2020 1112    G : SUPERIOR MARGIN Tissue Tissue SURGICAL PATHOLOGY Nicole Brown MD 1/20/2020 1113        Implants:  * No implants in log *      Drains: * No LDAs found *    Findings: Lumpectomy specimen removed with wire in middle.  Additional margins taken anterior, inferior, superior, posterior, medial and lateral.     Tone Nicole DO  Date: 1/20/2020  Time: 11:45 AM

## 2020-01-20 NOTE — PROGRESS NOTES
PACU Transfer to [facility]    Vitals:    01/20/20 1220   BP: 99/62   Pulse: 66   Resp: 13   Temp: 97 °F (36.1 °C)   SpO2: 99%         Intake/Output Summary (Last 24 hours) at 1/20/2020 1237  Last data filed at 1/20/2020 1220  Gross per 24 hour   Intake 2025 ml   Output --   Net 2025 ml       Pain assessment:  present - adequately treated  Pain Level: 0    Patient transferred to care of [facility] RN.    1/20/2020 12:37 PM

## 2020-01-20 NOTE — OP NOTE
Postoperative Note    Tanvi Conn  YOB: 1960  [id number]    Pre-operative Diagnosis: T is N0 right breast cancer    Post-operative Diagnosis: Same    Procedure:  right needle directed partial mastectomy,   right tissue rearrangement procedure for area of 45 sq. cm. Anesthesia: General, LMA    Surgeons/Assistants: Nicolasa Abdalla MD  Assistant: Dania Salgado MD    Estimated Blood Loss: less than 50     Drains: none    Complications: None apparent at conclusion of procedure    Specimens: right breast partial mastectomy, 2LL, 2SS, 1LA, six new shave margins, ink marks new margins    Findings: specimen radiograph shows capture of lesion in question, wire, clip, calcs    Post-Op Condition: Stable    Disposition: to recovery room    Description of Procedure:   Ms. Skye Johnson is a 61 y.o. woman with a clinical Tis N0 right breast cancer. She has elected to proceed with right breast conservation therapy. The indications for the planned procedure, along with the potential benefits and risks which include but are not limited to the risk of anesthesia, bleeding, infection, possible failed operation, possible need for additional surgery pending final pathologic assessment, lymphedema, sensation changes, and unappealing cosmetics were reviewed. All questions were answered and she agrees to proceed. Ms. Skye Johnson was met by me in the preoperative area. The surgical site was identified. Consent was obtained. The appropriate breast imaging was reviewed. She underwent an image guided localization procedure preoperatively which was performed by the on site radiologist. The 11:00 lesion was again noted with the biopsy clip. The localization needle enters her breast in a superior to inferior orientation. She was brought to the operating room and placed supine with her arms extended on boards. She was appropriately positioned and padded. Compression stockings were placed.   Appropriate antibiotics were administered within 60 minutes of the incision. Breast imaging was available in the room. After induction of general anesthesia, the appropriate World Health Organization timeout procedure was performed. The right breast and axillary region, upper arm, and chest wall were prepped and draped in the normal sterile fashion. There was one wire placed by the mammographer marking the right breast cancer. The skin and soft tissues over the proposed incision were infiltrated with local. A periareolar incision was made at the 11:00 location. Flaps were raised and dissection was carried out in a rectangular fashion from the needle entry site to tip. No skin was removed. Dissection was not carried to the chest wall. Dissection was carried out carefully to try and maintain the localization needle centrally within the specimen. The specimen was excised and gross examination revealed adequate margins. The specimen was oriented with a margin map. It was submitted for specimen radiography which revealed the lesion in question with adequate radiographic margins. An additional margin was obtained anterior, posterior, medial, lateral, superior, and inferior\". The wound was irrigated and hemostasis was obtained. The cavity was marked with surgical clips. In order to obtain the best cosmesis while closing the surgical cavity, I performed a tissue rearrangement. A separate tissue incision was made superiorly and inferiorly. Flaps were elevated and advanced for a total area of 45 square cm. Hemostasis was reassessed. The incision was closed in layers using a 3-0 vicryl stitch followed by a 4-0 monocryl subcuticular approximation. Skin glue was applied. The instrument, sponge, and needle counts were correct. Ms. Lavonne Fay was awakened and placed into a surgical bra. She was taken to the recovery room in stable condition. Her family was notified of intraoperative findings.     Electronically signed by Nabila Thakkar MD on 1/20/20 at 12:21 PM

## 2020-01-20 NOTE — PROGRESS NOTES
Ambulatory Surgery/Procedure Discharge Note  Pt alert and oriented  Bra intact   OR site clean dry and intact  Ice to OR area  1 Rx filled for percocet by [address]  IV dcd, fluids taken well  Verbal and written discharge instructions given to pt and   dcd per wheelchair to car with  present    Vitals:    01/20/20 1310   BP: 100/64   Pulse: 68   Resp: 14   Temp: 97 °F (36.1 °C)   SpO2: 99%       In: 2125 [P.O.:160; I.V.:1915]  Out: -     Restroom use offered before discharge. Yes    Pain assessment:  level of pain (1-10, 10 severe),   Pain Level: 1        Patient discharged to home/self care.  Patient discharged via wheel chair by transporter to waiting family/S.O.       1/20/2020 1:50 PM

## 2020-02-04 NOTE — PROGRESS NOTES
Donny Hericoncetta Post op 2 weeks after right needle directed partial mastectomy, right tissue rearrangement procedure for area of 45 sq. cm. She reports minimal pain and discomfort. Resp 16   Ht 5' 7.01\" (1.702 m)   Wt 142 lb 3.2 oz (64.5 kg)   BMI 22.27 kg/m²   Incision healing well. No erythema, swelling, or drainage. Appropriate postop changes. Pathology reviewed. FINAL DIAGNOSIS:       A. Right partial mastectomy:     - High-grade DCIS adjacent to and at a short distance from biopsy       site, and 2 mm from the black-inked posterior resection margin.     - Negative for invasive carcinoma.     - See synoptic report.       B. Anterior margin:     - Breast tissue negative for malignancy and significant atypia; see       synoptic report.       C. Posterior margin:     - Fibrofatty tissue negative for malignancy and significant atypia;       see synoptic report.       D. Lateral margin:     - Breast tissue negative for malignancy and significant atypia; see       synoptic report.       E. Inferior margin:     - Breast tissue negative for malignancy and significant atypia; see       synoptic report.    Esmer Needles. Medial margin:     - Breast tissue negative for malignancy and significant atypia; see       synoptic report.       G. Superior margin:     - Breast tissue negative for malignancy and significant atypia; see       synoptic report.     SYNOPTIC REPORT: DCIS OF THE BREAST: Resection    Procedure: Partial mastectomy with new margins (less than total  mastectomy)  Specimen laterality: Right  Size (extent) of DCIS: Three consecutive or four non-consecutive sections  (an estimated maximum of 1.5 cm)  Histologic type: Ductal carcinoma in situ  Nuclear grade: Grade III (high)  Necrosis: Present, focal (small foci or single cell necrosis)  Margins: Uninvolved by DCIS (no DCIS in any New Margin tissue (specimens  B-G)  Regional lymph nodes: No lymph nodes submitted or found  Pathologic Stage Classification (pTNM, AJCC 8th edition):   Primary tumor (pT): pTis (DCIS): Ductal carcinoma in situ   Regional lymph nodes (pN): pNX: Cannot be determined (none submitted or  found)    Assessment/Plan:  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 She has seen Dr. Stephen Byrd, and will plan to begin letrozole in approximately 6 weeks. She has seen Dr. Leslie Cain, and will plan to start whole breast radiation therapy in one week. Follow up 1-2 weeks after XRT.

## 2020-02-05 ENCOUNTER — OFFICE VISIT (OUTPATIENT)
Dept: SURGERY | Age: 60
End: 2020-02-05

## 2020-02-05 VITALS
SYSTOLIC BLOOD PRESSURE: 110 MMHG | HEART RATE: 76 BPM | WEIGHT: 142.2 LBS | BODY MASS INDEX: 22.32 KG/M2 | OXYGEN SATURATION: 98 % | TEMPERATURE: 98.4 F | DIASTOLIC BLOOD PRESSURE: 74 MMHG | HEIGHT: 67 IN | RESPIRATION RATE: 16 BRPM

## 2020-02-05 PROCEDURE — 99024 POSTOP FOLLOW-UP VISIT: CPT | Performed by: SURGERY

## 2020-03-05 ENCOUNTER — OFFICE VISIT (OUTPATIENT)
Dept: SURGERY | Age: 60
End: 2020-03-05

## 2020-03-05 VITALS
HEIGHT: 67 IN | SYSTOLIC BLOOD PRESSURE: 121 MMHG | HEART RATE: 77 BPM | BODY MASS INDEX: 22.25 KG/M2 | WEIGHT: 141.76 LBS | DIASTOLIC BLOOD PRESSURE: 80 MMHG | OXYGEN SATURATION: 100 % | RESPIRATION RATE: 16 BRPM

## 2020-03-05 PROCEDURE — 99024 POSTOP FOLLOW-UP VISIT: CPT | Performed by: SURGERY

## 2020-03-05 RX ORDER — LETROZOLE 2.5 MG/1
TABLET, FILM COATED ORAL
COMMUNITY
Start: 2020-02-03 | End: 2022-09-23 | Stop reason: ALTCHOICE

## 2020-03-05 NOTE — PROGRESS NOTES
Maggie Carter Post op 6 weeks after right needle directed partial mastectomy, right tissue rearrangement procedure for area of 45 sq. Cm for DCIS. She completed radiation therapy today. Minimal skin changes thus far. She is leaving shortly for 1 month trip to [place], [place]. /80 (Site: Right Upper Arm, Position: Sitting, Cuff Size: Medium Adult)   Pulse 77   Resp 16   Ht 5' 7.01\" (1.702 m)   Wt 141 lb 12.1 oz (64.3 kg)   SpO2 100%   BMI 22.20 kg/m²   Incision healing well. No erythema, swelling, or drainage. Appropriate postop changes. Excellent cosmesis. Pathology reviewed. FINAL DIAGNOSIS:       A. Right partial mastectomy:     - High-grade DCIS adjacent to and at a short distance from biopsy       site, and 2 mm from the black-inked posterior resection margin.     - Negative for invasive carcinoma.     - See synoptic report.       B. Anterior margin:     - Breast tissue negative for malignancy and significant atypia; see       synoptic report.       C. Posterior margin:     - Fibrofatty tissue negative for malignancy and significant atypia;       see synoptic report.       D. Lateral margin:     - Breast tissue negative for malignancy and significant atypia; see       synoptic report.       E. Inferior margin:     - Breast tissue negative for malignancy and significant atypia; see       synoptic report.    [place] [facility]. Medial margin:     - Breast tissue negative for malignancy and significant atypia; see       synoptic report.       G. Superior margin:     - Breast tissue negative for malignancy and significant atypia; see       synoptic report.     SYNOPTIC REPORT: DCIS OF THE BREAST: Resection    Procedure: Partial mastectomy with new margins (less than total  mastectomy)  Specimen laterality: Right  Size (extent) of DCIS: Three consecutive or four non-consecutive sections  (an estimated maximum of 1.5 cm)  Histologic type: Ductal carcinoma in situ  Nuclear grade: Grade III

## 2020-03-06 ENCOUNTER — HOSPITAL ENCOUNTER (OUTPATIENT)
Dept: GENERAL RADIOLOGY | Age: 60
Discharge: HOME | End: 2020-03-06
Payer: COMMERCIAL

## 2020-03-06 PROCEDURE — 77080 DXA BONE DENSITY AXIAL: CPT

## 2020-09-10 ENCOUNTER — HOSPITAL ENCOUNTER (OUTPATIENT)
Dept: MAMMOGRAPHY | Age: 60
Discharge: HOME | End: 2020-09-10
Payer: COMMERCIAL

## 2020-09-10 ENCOUNTER — OFFICE VISIT (OUTPATIENT)
Dept: SURGERY | Age: 60
End: 2020-09-10
Payer: COMMERCIAL

## 2020-09-10 VITALS
SYSTOLIC BLOOD PRESSURE: 102 MMHG | RESPIRATION RATE: 16 BRPM | BODY MASS INDEX: 21.82 KG/M2 | HEIGHT: 67 IN | TEMPERATURE: 98.1 F | HEART RATE: 68 BPM | DIASTOLIC BLOOD PRESSURE: 70 MMHG | WEIGHT: 139 LBS

## 2020-09-10 PROCEDURE — 99213 OFFICE O/P EST LOW 20 MIN: CPT | Performed by: SURGERY

## 2020-09-10 PROCEDURE — G0279 TOMOSYNTHESIS, MAMMO: HCPCS

## 2020-09-10 ASSESSMENT — ENCOUNTER SYMPTOMS
SHORTNESS OF BREATH: 0
BACK PAIN: 0
ABDOMINAL DISTENTION: 0
ABDOMINAL PAIN: 0
COUGH: 0

## 2021-03-09 ENCOUNTER — OFFICE VISIT (OUTPATIENT)
Dept: SURGERY | Age: 61
End: 2021-03-09
Payer: COMMERCIAL

## 2021-03-09 ENCOUNTER — HOSPITAL ENCOUNTER (OUTPATIENT)
Dept: MAMMOGRAPHY | Age: 61
Discharge: HOME | End: 2021-03-09
Payer: COMMERCIAL

## 2021-03-09 VITALS
WEIGHT: 139 LBS | DIASTOLIC BLOOD PRESSURE: 70 MMHG | HEIGHT: 60 IN | OXYGEN SATURATION: 98 % | HEART RATE: 70 BPM | TEMPERATURE: 98 F | BODY MASS INDEX: 27.29 KG/M2 | SYSTOLIC BLOOD PRESSURE: 103 MMHG

## 2021-03-09 DIAGNOSIS — D05.11 DUCTAL CARCINOMA IN SITU (DCIS) OF RIGHT BREAST: Primary | ICD-10-CM

## 2021-03-09 DIAGNOSIS — Z85.3 HX: BREAST CANCER: ICD-10-CM

## 2021-03-09 DIAGNOSIS — R92.8 ABNORMAL MAGNETIC RESONANCE IMAGING OF RIGHT BREAST: ICD-10-CM

## 2021-03-09 DIAGNOSIS — Z90.11 S/P PARTIAL MASTECTOMY, RIGHT: ICD-10-CM

## 2021-03-09 PROCEDURE — 99213 OFFICE O/P EST LOW 20 MIN: CPT | Performed by: SURGERY

## 2021-03-09 PROCEDURE — G0279 TOMOSYNTHESIS, MAMMO: HCPCS

## 2021-03-09 RX ORDER — CHOLECALCIFEROL (VITAMIN D3) 125 MCG
CAPSULE ORAL
COMMUNITY

## 2021-03-09 NOTE — PROGRESS NOTES
Subjective:      Patient ID: Valerie Ford is a 61 y.o. female. HPI   Chief Complaint   Patient presents with    6 Month Follow-Up     6 month follow up      Patient is s/p right lumpectomy 1/2020 for high grade DCIS, 1.5 cm ([place]rem), ER positive. Postop radiation, on Letrozole. She has not felt any masses, no breast pain or nipple discharge. Right mammogram today BIRADS 2B. Past Medical History:   Diagnosis Date    Cancer [facility])     SKIN    DCIS (ductal carcinoma in situ)     Hyperlipidemia        Past Surgical History:   Procedure Laterality Date    BREAST LUMPECTOMY Right 1/20/2020    RIGHT BREAST NEEDLE LOCALIZED, PARTIAL MASTECTOMY performed by Tammie Smith MD at [facility] 2  FOOT SURGERY    SKIN BIOPSY      NOSE    VAGINA SURGERY      DELIVERIES       Current Outpatient Medications   Medication Sig Dispense Refill    letrozole (FEMARA) 2.5 MG tablet       atorvastatin (LIPITOR) 20 MG tablet Take 20 mg by mouth      multivitamin-iron-minerals-folic acid (CENTRUM) chewable tablet Take 1 tablet by mouth daily. No current facility-administered medications for this visit.         Social History     Socioeconomic History    Marital status:      Spouse name: Not on file    Number of children: Not on file    Years of education: Not on file    Highest education level: Not on file   Occupational History    Not on file   Social Needs    Financial resource strain: Not on file    Food insecurity     Worry: Not on file     Inability: Not on file    Transportation needs     Medical: Not on file     Non-medical: Not on file   Tobacco Use    Smoking status: Never Smoker    Smokeless tobacco: Never Used   Substance and Sexual Activity    Alcohol use: Yes     Comment: occasional    Drug use: No    Sexual activity: Not on file   Lifestyle    Physical activity     Days per week: Not on file     Minutes per session: Not on file    Stress: Not on file Relationships    Social connections     Talks on phone: Not on file     Gets together: Not on file     Attends Uatsdin service: Not on file     Active member of club or organization: Not on file     Attends meetings of clubs or organizations: Not on file     Relationship status: Not on file    Intimate partner violence     Fear of current or ex partner: Not on file     Emotionally abused: Not on file     Physically abused: Not on file     Forced sexual activity: Not on file   Other Topics Concern    Not on file   Social History Narrative    Not on file       ROS  Constitutional: no weight loss, fever, night sweats   Skin: negative  Cardiovascular: no chest pain or palpitations   Pulmonary: No cough, sputum, or hemoptysis   GI:No abdominal pain  Breast: see above  All other systems were reviewed and are negative    Objective:   Physical Exam  Vitals signs reviewed. Exam conducted with a chaperone present. Constitutional:       General: She is not in acute distress. Appearance: Normal appearance. She is well-developed. She is not diaphoretic. HENT:      Head: Normocephalic and atraumatic. Nose: Nose normal.      Mouth/Throat:      Mouth: Mucous membranes are moist.      Pharynx: Oropharynx is clear. Neck:      Musculoskeletal: Normal range of motion. No muscular tenderness. Trachea: No tracheal deviation. Cardiovascular:      Rate and Rhythm: Normal rate. Pulmonary:      Effort: Pulmonary effort is normal. No respiratory distress. Breath sounds: No wheezing. Abdominal:      General: There is no distension. Palpations: Abdomen is soft. Musculoskeletal: Normal range of motion. General: No tenderness. Lymphadenopathy:      Cervical: No cervical adenopathy. Upper Body:      Right upper body: No axillary adenopathy. Left upper body: No axillary adenopathy. Skin:     General: Skin is warm and dry. Coloration: Skin is not pale.       Findings: No erythema or rash. Neurological:      Mental Status: She is alert and oriented to person, place, and time. Cranial Nerves: No cranial nerve deficit. Coordination: Coordination normal.   Psychiatric:         Behavior: Behavior normal.         Thought Content: Thought content normal.         Judgment: Judgment normal.     bilateral breasts - no masses, no nipple or skin changes    Assessment:       Diagnosis Orders   1. Ductal carcinoma in situ (DCIS) of right breast     2. S/P partial mastectomy, right             Plan:      Mammogram was reviewed. At the present time, there is no concern for breast cancer recurrence. Healthy lifestyle modifications were reviewed with the patient.   Continue monthly self breast exam, f/u in 6 months with bilateral mammogram.           Yuly Davis MD

## 2021-03-09 NOTE — PATIENT INSTRUCTIONS
Mammogram results were discussed with patient today in office  Breast exam was unremarkable for any palpable masses  Continue with monthly self breast exams  Return to office in September with bilateral screening mammogram and office visit with Dr. Mickie Rasheed    Healthy Lifestyle Recommendations: healthy diet (decrease consumption of red meat, increase fresh fruits and vegetables), decreased alcohol consumption (less than 4 drinks/week), adequate sleep (goal 6-8 hours), routine exercise (goal 150 minutes/week or greater), weight control.

## 2021-09-14 ENCOUNTER — OFFICE VISIT (OUTPATIENT)
Dept: SURGERY | Age: 61
End: 2021-09-14
Payer: COMMERCIAL

## 2021-09-14 ENCOUNTER — HOSPITAL ENCOUNTER (OUTPATIENT)
Dept: MAMMOGRAPHY | Age: 61
Discharge: HOME | End: 2021-09-14
Payer: COMMERCIAL

## 2021-09-14 VITALS — WEIGHT: 137 LBS | BODY MASS INDEX: 21.5 KG/M2 | HEIGHT: 67 IN

## 2021-09-14 VITALS
BODY MASS INDEX: 22.54 KG/M2 | TEMPERATURE: 97.1 F | HEIGHT: 67 IN | HEART RATE: 64 BPM | WEIGHT: 143.6 LBS | RESPIRATION RATE: 18 BRPM | OXYGEN SATURATION: 99 % | DIASTOLIC BLOOD PRESSURE: 70 MMHG | SYSTOLIC BLOOD PRESSURE: 112 MMHG

## 2021-09-14 DIAGNOSIS — Z90.11 S/P PARTIAL MASTECTOMY, RIGHT: ICD-10-CM

## 2021-09-14 DIAGNOSIS — D05.11 DUCTAL CARCINOMA IN SITU (DCIS) OF RIGHT BREAST: Primary | ICD-10-CM

## 2021-09-14 DIAGNOSIS — D05.11 DUCTAL CARCINOMA IN SITU (DCIS) OF RIGHT BREAST: ICD-10-CM

## 2021-09-14 PROCEDURE — 99213 OFFICE O/P EST LOW 20 MIN: CPT | Performed by: SURGERY

## 2021-09-14 PROCEDURE — 77063 BREAST TOMOSYNTHESIS BI: CPT

## 2021-09-14 RX ORDER — EZETIMIBE 10 MG/1
10 TABLET ORAL DAILY
COMMUNITY
Start: 2021-07-30

## 2021-09-14 NOTE — PROGRESS NOTES
activity: Not on file   Other Topics Concern    Not on file   Social History Narrative    Not on file     Social Determinants of Health     Financial Resource Strain:     Difficulty of Paying Living Expenses:    Food Insecurity:     Worried About Running Out of Food in the Last Year:     [address] in the Last Year:    Transportation Needs:     Lack of Transportation (Medical):  Lack of Transportation (Non-Medical):    Physical Activity:     Days of Exercise per Week:     Minutes of Exercise per Session:    Stress:     Feeling of Stress :    Social Connections:     Frequency of Communication with Friends and Family:     Frequency of Social Gatherings with Friends and Family:     Attends Voodoo Services:     Active Member of Clubs or Organizations:     Attends Club or Organization Meetings:     Marital Status:    Intimate Partner Violence:     Fear of Current or Ex-Partner:     Emotionally Abused:     Physically Abused:     Sexually Abused:        Objective:   Physical Exam    Right breast - lumpectomy scar well healed, no masses, no nipple or skin changes  Left breast - Normal contour, no masses, no nipple or skin changes. No cervical or axillary adenopathy. Assessment:      Diagnosis Orders   1. Ductal carcinoma in situ (DCIS) of right breast     2. S/P partial mastectomy, right            Plan:     Mammogram was reviewed. At the present time, there is no concern for breast cancer recurrence. Healthy lifestyle modifications were reviewed with the patient. Continue monthly self breast exam, f/u with me in 1 year with mammogram.      On this date 09/14/21 I have spent 20 minutes reviewing previous notes, test results, and face to face with the patient discussing the diagnosis and importance of compliance with the treatment plan as well as documenting on the day of the visit.      Electronically signed by Austin Neville MD on 9/14/2021 at 9:26 AM

## 2022-03-10 ENCOUNTER — HOSPITAL ENCOUNTER (OUTPATIENT)
Dept: GENERAL RADIOLOGY | Age: 62
Discharge: HOME | End: 2022-03-10
Payer: COMMERCIAL

## 2022-03-10 DIAGNOSIS — M85.80 SENILE OSTEOPENIA: ICD-10-CM

## 2022-03-10 PROCEDURE — 77080 DXA BONE DENSITY AXIAL: CPT

## 2022-04-27 ENCOUNTER — TELEPHONE (OUTPATIENT)
Dept: BREAST CENTER | Age: 62
End: 2022-04-27

## 2022-04-27 NOTE — TELEPHONE ENCOUNTER
- Called patient left message on voice mail to reschedule   - appointment on 09/20/22 with Mammogram- with Dr. Rajni Vigil.   - Dr Rajni Vigil is out of office    Please reschedule appointment,   Thank you

## 2022-09-23 ENCOUNTER — HOSPITAL ENCOUNTER (OUTPATIENT)
Dept: MAMMOGRAPHY | Age: 62
Discharge: HOME | End: 2022-09-23
Payer: COMMERCIAL

## 2022-09-23 ENCOUNTER — OFFICE VISIT (OUTPATIENT)
Dept: SURGERY | Age: 62
End: 2022-09-23
Payer: COMMERCIAL

## 2022-09-23 VITALS — WEIGHT: 137 LBS | HEIGHT: 67 IN | BODY MASS INDEX: 21.5 KG/M2

## 2022-09-23 VITALS
WEIGHT: 138 LBS | DIASTOLIC BLOOD PRESSURE: 76 MMHG | HEART RATE: 85 BPM | BODY MASS INDEX: 21.66 KG/M2 | RESPIRATION RATE: 18 BRPM | OXYGEN SATURATION: 97 % | SYSTOLIC BLOOD PRESSURE: 116 MMHG | HEIGHT: 67 IN

## 2022-09-23 DIAGNOSIS — Z90.11 S/P PARTIAL MASTECTOMY, RIGHT: ICD-10-CM

## 2022-09-23 DIAGNOSIS — Z12.31 VISIT FOR SCREENING MAMMOGRAM: ICD-10-CM

## 2022-09-23 DIAGNOSIS — D05.11 DUCTAL CARCINOMA IN SITU (DCIS) OF RIGHT BREAST: Primary | ICD-10-CM

## 2022-09-23 PROCEDURE — 77063 BREAST TOMOSYNTHESIS BI: CPT

## 2022-09-23 PROCEDURE — 99213 OFFICE O/P EST LOW 20 MIN: CPT | Performed by: SURGERY

## 2022-09-23 RX ORDER — ZOLEDRONIC ACID 5 MG/100ML
5 INJECTION, SOLUTION INTRAVENOUS
COMMUNITY
Start: 2022-04-19

## 2022-09-23 NOTE — PROGRESS NOTES
Subjective:      Patient ID: Zenaida Moreno is a 58 y.o. female. HPI   Chief Complaint   Patient presents with    1 Year Follow Up     Patient presents today for a one year follow up. She had a mammogram this afternoon. Patient is s/p right lumpectomy 1/2020 for high grade DCIS, 1.5 cm ([place]rem), ER positive. Postop radiation, on Letrozole, tolerating well. She has not felt any masses, no breast pain or nipple discharge. Bilateral mammogram today BIRADS 2B. Past Medical History:   Diagnosis Date    Cancer ([facility] [address]     SKIN    DCIS (ductal carcinoma in situ)     History of therapeutic radiation     Hyperlipidemia        Past Surgical History:   Procedure Laterality Date    BREAST LUMPECTOMY Right 1/20/2020    RIGHT BREAST NEEDLE LOCALIZED, PARTIAL MASTECTOMY performed by Jerel Mathew MD at [address]       X 2  FOOT SURGERY    SKIN BIOPSY      NOSE    VAGINA SURGERY      DELIVERIES       Current Outpatient Medications   Medication Sig Dispense Refill    zoledronic acid (RECLAST) 5 MG/100ML SOLN Infuse 5 mg intravenously      ezetimibe (ZETIA) 10 MG tablet Take 10 mg by mouth daily      melatonin 5 MG TABS tablet melatonin 5 MG Oral Tablet  PO 1.0  as needed    Active      Calcium Citrate-Vitamin D 200-250 MG-UNIT TABS Take by mouth      multivitamin-iron-minerals-folic acid (CENTRUM) chewable tablet Take 1 tablet by mouth daily. No current facility-administered medications for this visit.        Social History     Socioeconomic History    Marital status:      Spouse name: Not on file    Number of children: Not on file    Years of education: Not on file    Highest education level: Not on file   Occupational History    Not on file   Tobacco Use    Smoking status: Never    Smokeless tobacco: Never   Vaping Use    Vaping Use: Never used   Substance and Sexual Activity    Alcohol use: Yes     Comment: occasional    Drug use: No    Sexual activity: Not on file   Other Topics Concern Not on file   Social History Narrative    Not on file     Social Determinants of Health     Financial Resource Strain: Not on file   Food Insecurity: Not on file   Transportation Needs: Not on file   Physical Activity: Not on file   Stress: Not on file   Social Connections: Not on file   Intimate Partner Violence: Not on file   Housing Stability: Not on file       Objective:   Physical Exam    Right breast - lumpectomy scar well healed, no masses, no nipple or skin changes  Left breast - Normal contour, no masses, no nipple or skin changes. No cervical or axillary adenopathy. Assessment:      Diagnosis Orders   1. Ductal carcinoma in situ (DCIS) of right breast        2. S/P partial mastectomy, right               Plan:     Mammogram was reviewed. No masses on exam. At the present time, there is no concern for breast cancer recurrence. Healthy lifestyle modifications were reviewed with the patient. Continue monthly self breast exam, f/u with me in 1 year with mammogram.      On this date 09/23/22 I have spent 20 minutes reviewing previous notes, test results, and face to face with the patient discussing the diagnosis and importance of compliance with the treatment plan as well as documenting on the day of the visit.      Electronically signed by Izabela Park MD on 9/23/2022 at 2:32 PM

## 2023-09-26 ENCOUNTER — OFFICE VISIT (OUTPATIENT)
Dept: SURGERY | Age: 63
End: 2023-09-26
Payer: COMMERCIAL

## 2023-09-26 ENCOUNTER — HOSPITAL ENCOUNTER (OUTPATIENT)
Dept: ULTRASOUND IMAGING | Age: 63
Discharge: HOME | End: 2023-09-26
Payer: COMMERCIAL

## 2023-09-26 ENCOUNTER — HOSPITAL ENCOUNTER (OUTPATIENT)
Dept: MAMMOGRAPHY | Age: 63
Discharge: HOME | End: 2023-09-26
Payer: COMMERCIAL

## 2023-09-26 VITALS
OXYGEN SATURATION: 97 % | SYSTOLIC BLOOD PRESSURE: 98 MMHG | BODY MASS INDEX: 22.13 KG/M2 | HEART RATE: 83 BPM | WEIGHT: 141 LBS | HEIGHT: 67 IN | DIASTOLIC BLOOD PRESSURE: 68 MMHG | RESPIRATION RATE: 18 BRPM

## 2023-09-26 VITALS — HEIGHT: 67 IN | BODY MASS INDEX: 21.66 KG/M2 | WEIGHT: 138 LBS

## 2023-09-26 DIAGNOSIS — Z90.11 S/P PARTIAL MASTECTOMY, RIGHT: ICD-10-CM

## 2023-09-26 DIAGNOSIS — Z12.31 VISIT FOR SCREENING MAMMOGRAM: ICD-10-CM

## 2023-09-26 DIAGNOSIS — R92.8 ABNORMAL MAMMOGRAM: ICD-10-CM

## 2023-09-26 DIAGNOSIS — D05.11 DUCTAL CARCINOMA IN SITU (DCIS) OF RIGHT BREAST: Primary | ICD-10-CM

## 2023-09-26 DIAGNOSIS — R92.8 ABNORMAL MAMMOGRAM OF LEFT BREAST: ICD-10-CM

## 2023-09-26 PROCEDURE — 77063 BREAST TOMOSYNTHESIS BI: CPT

## 2023-09-26 PROCEDURE — 99213 OFFICE O/P EST LOW 20 MIN: CPT | Performed by: SURGERY

## 2023-09-26 PROCEDURE — 76642 ULTRASOUND BREAST LIMITED: CPT

## 2023-09-26 NOTE — PATIENT INSTRUCTIONS
Mammogram reviewed, no concerning findings  Breast exam performed, no palpable masses. Continue self breast exams    Healthy Lifestyle Recommendations: healthy diet (decrease consumption of red meat, increase fresh fruits and vegetables), decreased alcohol consumption (less than 4 drinks/week), adequate sleep (goal 6-8 hours), routine exercise (goal 150 minutes/week or greater), weight control.      Left breast u/s today for anomaly on mammogram-office will call with results    May need to adjust follow up schedule depending upon what u/s shows    Return: Around 9/27/24 bilateral screen mammogram and breast exam

## 2023-09-26 NOTE — PROGRESS NOTES
9/26/2023    Chief Complaint   Patient presents with    1 Year Follow Up     Breast exam        HPI Patient is s/p right lumpectomy 1/2020 for high grade DCIS, 1.5 cm ([place]), ER positive. Postop radiation. She took 2 years of Letrozole, then opted to stop. She has not felt any masses, no breast pain or nipple discharge. Bilateral mammogram today BIRADS 0. New left breast 6 mm oval mass 7:00, ultrasound recommended. Current Outpatient Medications:     Calcium Citrate-Vitamin D 200-250 MG-UNIT TABS, Take by mouth, Disp: , Rfl:     multivitamin-iron-minerals-folic acid (CENTRUM) chewable tablet, Take 1 tablet by mouth daily, Disp: , Rfl:       Past Medical History:   Diagnosis Date    Breast cancer ([facility])     Cancer ([facility])     SKIN    DCIS (ductal carcinoma in situ)     History of therapeutic radiation     Hyperlipidemia        Past Surgical History:   Procedure Laterality Date    BREAST LUMPECTOMY Right 1/20/2020    RIGHT BREAST NEEDLE LOCALIZED, PARTIAL MASTECTOMY performed by Yuliay Thompson MD at [address] [place] [address]       X 2  FOOT SURGERY    SKIN BIOPSY      NOSE    VAGINA SURGERY      DELIVERIES       Social History     Tobacco Use    Smoking status: Never    Smokeless tobacco: Never   Vaping Use    Vaping Use: Never used   Substance Use Topics    Alcohol use: Yes     Comment: occasional    Drug use: No         Physical Exam    Right breast - lumpectomy scar well healed, no masses, no nipple or skin changes  Left breast - Normal contour, no masses, no nipple or skin changes. No cervical or axillary adenopathy. ASSESSMENT   Diagnosis Orders   1. Ductal carcinoma in situ (DCIS) of right breast        2. S/P partial mastectomy, right             PLAN    Mammogram was reviewed. No masses on exam.   Will obtain left breast ultrasound today. Addendum: left breast ultrasound shows a cluster of cysts/complicated cyst, probable benign.  BIRADS 3  Continue monthly self breast exam, f/u in 6 months

## 2024-04-11 ENCOUNTER — HOSPITAL ENCOUNTER (OUTPATIENT)
Dept: ULTRASOUND IMAGING | Age: 64
Discharge: HOME | End: 2024-04-11
Attending: INTERNAL MEDICINE
Payer: COMMERCIAL

## 2024-04-11 ENCOUNTER — HOSPITAL ENCOUNTER (OUTPATIENT)
Dept: GENERAL RADIOLOGY | Age: 64
Discharge: HOME | End: 2024-04-11
Attending: INTERNAL MEDICINE
Payer: COMMERCIAL

## 2024-04-11 ENCOUNTER — OFFICE VISIT (OUTPATIENT)
Dept: SURGERY | Age: 64
End: 2024-04-11
Payer: COMMERCIAL

## 2024-04-11 VITALS
HEIGHT: 67 IN | WEIGHT: 138.6 LBS | HEART RATE: 64 BPM | OXYGEN SATURATION: 98 % | BODY MASS INDEX: 21.75 KG/M2 | RESPIRATION RATE: 18 BRPM

## 2024-04-11 DIAGNOSIS — Z90.11 S/P PARTIAL MASTECTOMY, RIGHT: ICD-10-CM

## 2024-04-11 DIAGNOSIS — R92.8 ABNORMAL MAMMOGRAM OF LEFT BREAST: ICD-10-CM

## 2024-04-11 DIAGNOSIS — Z80.3 FAMILY HISTORY OF BREAST CANCER: ICD-10-CM

## 2024-04-11 DIAGNOSIS — N95.8 OTHER SPECIFIED MENOPAUSAL AND PERIMENOPAUSAL DISORDERS: ICD-10-CM

## 2024-04-11 DIAGNOSIS — Z85.3 HISTORY OF BREAST CANCER: ICD-10-CM

## 2024-04-11 DIAGNOSIS — D05.11 DUCTAL CARCINOMA IN SITU (DCIS) OF RIGHT BREAST: ICD-10-CM

## 2024-04-11 DIAGNOSIS — R92.8 ABNORMAL FINDING ON BREAST IMAGING: Primary | ICD-10-CM

## 2024-04-11 PROCEDURE — 99213 OFFICE O/P EST LOW 20 MIN: CPT | Performed by: NURSE PRACTITIONER

## 2024-04-11 PROCEDURE — 76642 ULTRASOUND BREAST LIMITED: CPT

## 2024-04-11 PROCEDURE — 77080 DXA BONE DENSITY AXIAL: CPT

## 2024-04-11 NOTE — PROGRESS NOTES
[facility]  Surgical Breast Oncology      Medical Oncologist:  Radiation Oncologist:       CC: 6m left breast U/S follow up          HPI: Merna Smith is a 63 y.o. female here for 6m left breast U/S follow up.  She has a personal history of right breast cancer.  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 Postop radiation. She took 2 years of Letrozole, then opted to stop.    She has no breast related concerns today.  She states that she does perform routine self breast evaluations and has not noticed any new abnormalities such as masses, skin changes, color changes,nipple discharge, or changes to the nipple-areolar complex.  Denies unintentional weight loss, bone pain, headaches and has no other systemic complaints.    Walks one hour daily, floor exercises, weights every other day.    Left breast U/S today 4/11/2024 shows 2x1mm cyst at 7:00, 3cm FN left breast in area of prior cystic lesion.  Almost complete resolution of the finding, consistent with benign pathology.  BI-RADS2.       Past Medical History:   Diagnosis Date    Breast cancer (HCC)     Cancer (HCC)     SKIN    DCIS (ductal carcinoma in situ)     History of therapeutic radiation     Hyperlipidemia        Past Surgical History:   Procedure Laterality Date    BREAST LUMPECTOMY Right 1/20/2020    RIGHT BREAST NEEDLE LOCALIZED, PARTIAL MASTECTOMY performed by Pat Marmolejo MD at [facility] OR    [facility]       X 2  FOOT SURGERY    SKIN BIOPSY      NOSE    VAGINA SURGERY      DELIVERIES       Family History   Problem Relation Age of Onset    Other Mother 75        dementia    High Blood Pressure Mother     Breast Cancer Mother     Breast Cancer Maternal Grandmother     Other Child         crohn    Ovarian Cancer Neg Hx        Allergies as of 04/11/2024    (No Known Allergies)       Social History     Tobacco Use    Smoking status: Never    Smokeless tobacco: Never   Vaping Use    Vaping Use: Never used

## 2024-04-12 DIAGNOSIS — Z85.3 HISTORY OF BREAST CANCER: ICD-10-CM

## 2024-04-12 DIAGNOSIS — Z12.31 ENCOUNTER FOR SCREENING MAMMOGRAM FOR BREAST CANCER: Primary | ICD-10-CM

## 2024-10-10 ENCOUNTER — OFFICE VISIT (OUTPATIENT)
Dept: SURGERY | Age: 64
End: 2024-10-10
Payer: COMMERCIAL

## 2024-10-10 ENCOUNTER — HOSPITAL ENCOUNTER (OUTPATIENT)
Dept: MAMMOGRAPHY | Age: 64
Discharge: HOME | End: 2024-10-10
Payer: COMMERCIAL

## 2024-10-10 VITALS
BODY MASS INDEX: 22 KG/M2 | WEIGHT: 140.2 LBS | OXYGEN SATURATION: 98 % | HEIGHT: 67 IN | RESPIRATION RATE: 18 BRPM | HEART RATE: 64 BPM

## 2024-10-10 VITALS — BODY MASS INDEX: 21.5 KG/M2 | HEIGHT: 67 IN | WEIGHT: 137 LBS

## 2024-10-10 DIAGNOSIS — Z12.31 BREAST CANCER SCREENING BY MAMMOGRAM: ICD-10-CM

## 2024-10-10 DIAGNOSIS — Z85.3 HISTORY OF BREAST CANCER: Primary | ICD-10-CM

## 2024-10-10 DIAGNOSIS — Z12.31 ENCOUNTER FOR SCREENING MAMMOGRAM FOR BREAST CANCER: ICD-10-CM

## 2024-10-10 DIAGNOSIS — Z85.3 HISTORY OF BREAST CANCER: ICD-10-CM

## 2024-10-10 DIAGNOSIS — D05.11 DUCTAL CARCINOMA IN SITU (DCIS) OF RIGHT BREAST: ICD-10-CM

## 2024-10-10 DIAGNOSIS — Z80.3 FAMILY HISTORY OF BREAST CANCER: ICD-10-CM

## 2024-10-10 DIAGNOSIS — Z90.11 S/P PARTIAL MASTECTOMY, RIGHT: ICD-10-CM

## 2024-10-10 PROCEDURE — 99213 OFFICE O/P EST LOW 20 MIN: CPT | Performed by: NURSE PRACTITIONER

## 2024-10-10 PROCEDURE — 77063 BREAST TOMOSYNTHESIS BI: CPT

## 2024-10-10 NOTE — PROGRESS NOTES
[facility]  Surgical Breast Oncology      Medical Oncologist:  Radiation Oncologist:       CC: 6m mammogram and breast check      HPI: Merna Smith is a 64 y.o. female here for 6m follow up for annual mammogram and breast check.  She has a personal history of right breast cancer.  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 Postop radiation. She took 2 years of Letrozole, then opted to stop.    She has no breast related concerns today.  She states that she does perform routine self breast evaluations and has not noticed any new abnormalities such as masses, skin changes, color changes,nipple discharge, or changes to the nipple-areolar complex.  Denies unintentional weight loss, bone pain, headaches and has no other systemic complaints.    Walks one hour daily, floor exercises, weights every other day.    Bilateral screening mammogram 10/10/2024:  Surgical clips and distortion upper outer right breast.  No new concerning findings suggestive of malignancy.  BI-RADS 2.        Past Medical History:   Diagnosis Date    Breast cancer (HCC)     Cancer (HCC)     SKIN    DCIS (ductal carcinoma in situ)     History of therapeutic radiation     Hyperlipidemia        Past Surgical History:   Procedure Laterality Date    BREAST LUMPECTOMY Right 1/20/2020    RIGHT BREAST NEEDLE LOCALIZED, PARTIAL MASTECTOMY performed by Pat Marmolejo MD at [facility] OR    [facility]       X 2  FOOT SURGERY    SKIN BIOPSY      NOSE    VAGINA SURGERY      DELIVERIES       Family History   Problem Relation Age of Onset    Other Mother 75        dementia    High Blood Pressure Mother     Breast Cancer Mother     Breast Cancer Maternal Grandmother     Other Child         crohn    Ovarian Cancer Neg Hx        Allergies as of 10/10/2024    (No Known Allergies)       Social History     Tobacco Use    Smoking status: Never    Smokeless tobacco: Never   Vaping Use    Vaping status: Never Used   Substance Use

## (undated) DEVICE — GLOVE SURG SZ 65 L12IN FNGR THK87MIL DK GRN LTX POLYMER W

## (undated) DEVICE — JEWISH HOSPITAL TURNOVER KIT: Brand: MEDLINE INDUSTRIES, INC.

## (undated) DEVICE — SYRINGE, LUER LOCK, 10ML: Brand: MEDLINE

## (undated) DEVICE — INTENDED FOR TISSUE SEPARATION, AND OTHER PROCEDURES THAT REQUIRE A SHARP SURGICAL BLADE TO PUNCTURE OR CUT.: Brand: BARD-PARKER ® CARBON RIB-BACK BLADES

## (undated) DEVICE — ELECTRODE BLDE L4IN NONINSULATED EDGE

## (undated) DEVICE — SUTURE MCRYL SZ 4-0 L27IN ABSRB UD L19MM PS-2 1/2 CIR PRIM Y426H

## (undated) DEVICE — CHLORAPREP 26ML ORANGE

## (undated) DEVICE — CLIP SM RED INTERN HMOCLP TITAN LIGATING

## (undated) DEVICE — STANDARD HYPODERMIC NEEDLE,POLYPROPYLENE HUB: Brand: MONOJECT

## (undated) DEVICE — PENCIL ES ULT VAC W TELSCP NOSE EZ CLN BLDE 10FT

## (undated) DEVICE — SURGICAL SET UP - SURE SET: Brand: MEDLINE INDUSTRIES, INC.

## (undated) DEVICE — SUTURE VCRL SZ 3-0 L18IN ABSRB UD L26MM SH 1/2 CIR J864D

## (undated) DEVICE — GLOVE SURG SZ 6 L112IN FNGR THK75MIL STRW LTX POLYMER BEAD

## (undated) DEVICE — GARMENT,MEDLINE,DVT,INT,CALF,MED, GEN2: Brand: MEDLINE

## (undated) DEVICE — SURE SET-DOUBLE BASIN-LF: Brand: MEDLINE INDUSTRIES, INC.

## (undated) DEVICE — SOLUTION IV 1000ML 0.9% SOD CHL

## (undated) DEVICE — DRAPE,T,LAPARO,TRANS,STERILE: Brand: MEDLINE

## (undated) DEVICE — COVER LT HNDL BLU PLAS

## (undated) DEVICE — SUTURE VCRL SZ 3-0 L27IN ABSRB UD L26MM SH 1/2 CIR J416H

## (undated) DEVICE — SPONGE,LAP,18"X18",DLX,XR,ST,5/PK,40/PK: Brand: MEDLINE

## (undated) DEVICE — ST FLUFF LG 1 PLY: Brand: DEROYAL

## (undated) DEVICE — Z DISCONTINUED PER MEDLINE BANDAGE COMPR M MAMM COMFORTABLE HIGHLY ABSRB POST SURG

## (undated) DEVICE — ADHESIVE SKIN CLSR 0.7ML TOP DERMBND ADV

## (undated) DEVICE — PLATE ES AD W 9FT CRD 2

## (undated) DEVICE — YANKAUER,OPEN TIP,W/O VENT,STERILE: Brand: MEDLINE INDUSTRIES, INC.